# Patient Record
Sex: FEMALE | Race: WHITE | NOT HISPANIC OR LATINO | Employment: UNEMPLOYED | URBAN - METROPOLITAN AREA
[De-identification: names, ages, dates, MRNs, and addresses within clinical notes are randomized per-mention and may not be internally consistent; named-entity substitution may affect disease eponyms.]

---

## 2019-04-03 ENCOUNTER — APPOINTMENT (OUTPATIENT)
Dept: RADIOLOGY | Facility: MEDICAL CENTER | Age: 24
End: 2019-04-03
Attending: EMERGENCY MEDICINE
Payer: MEDICAID

## 2019-04-03 ENCOUNTER — HOSPITAL ENCOUNTER (EMERGENCY)
Facility: MEDICAL CENTER | Age: 24
End: 2019-04-03
Attending: EMERGENCY MEDICINE
Payer: MEDICAID

## 2019-04-03 VITALS
OXYGEN SATURATION: 96 % | TEMPERATURE: 97.7 F | HEART RATE: 65 BPM | DIASTOLIC BLOOD PRESSURE: 65 MMHG | SYSTOLIC BLOOD PRESSURE: 121 MMHG | RESPIRATION RATE: 14 BRPM | BODY MASS INDEX: 32.95 KG/M2 | WEIGHT: 163.14 LBS

## 2019-04-03 DIAGNOSIS — N93.8 DYSFUNCTIONAL UTERINE BLEEDING: ICD-10-CM

## 2019-04-03 LAB
APPEARANCE UR: CLEAR
B-HCG SERPL-ACNC: <0.6 MIU/ML (ref 0–5)
BACTERIA #/AREA URNS HPF: ABNORMAL /HPF
BASOPHILS # BLD AUTO: 0.2 % (ref 0–1.8)
BASOPHILS # BLD: 0.01 K/UL (ref 0–0.12)
BILIRUB UR QL STRIP.AUTO: NEGATIVE
COLOR UR: ABNORMAL
EOSINOPHIL # BLD AUTO: 0.05 K/UL (ref 0–0.51)
EOSINOPHIL NFR BLD: 0.8 % (ref 0–6.9)
EPI CELLS #/AREA URNS HPF: ABNORMAL /HPF
ERYTHROCYTE [DISTWIDTH] IN BLOOD BY AUTOMATED COUNT: 46.1 FL (ref 35.9–50)
GLUCOSE UR STRIP.AUTO-MCNC: NEGATIVE MG/DL
HCT VFR BLD AUTO: 38.6 % (ref 37–47)
HGB BLD-MCNC: 13 G/DL (ref 12–16)
IMM GRANULOCYTES # BLD AUTO: 0.01 K/UL (ref 0–0.11)
IMM GRANULOCYTES NFR BLD AUTO: 0.2 % (ref 0–0.9)
KETONES UR STRIP.AUTO-MCNC: NEGATIVE MG/DL
LEUKOCYTE ESTERASE UR QL STRIP.AUTO: ABNORMAL
LYMPHOCYTES # BLD AUTO: 1.59 K/UL (ref 1–4.8)
LYMPHOCYTES NFR BLD: 24.4 % (ref 22–41)
MCH RBC QN AUTO: 32.7 PG (ref 27–33)
MCHC RBC AUTO-ENTMCNC: 33.7 G/DL (ref 33.6–35)
MCV RBC AUTO: 97 FL (ref 81.4–97.8)
MICRO URNS: ABNORMAL
MONOCYTES # BLD AUTO: 0.38 K/UL (ref 0–0.85)
MONOCYTES NFR BLD AUTO: 5.8 % (ref 0–13.4)
NEUTROPHILS # BLD AUTO: 4.48 K/UL (ref 2–7.15)
NEUTROPHILS NFR BLD: 68.6 % (ref 44–72)
NITRITE UR QL STRIP.AUTO: NEGATIVE
NRBC # BLD AUTO: 0 K/UL
NRBC BLD-RTO: 0 /100 WBC
NUMBER OF RH DOSES IND 8505RD: NORMAL
PH UR STRIP.AUTO: 7 [PH]
PLATELET # BLD AUTO: 189 K/UL (ref 164–446)
PMV BLD AUTO: 10.1 FL (ref 9–12.9)
PROT UR QL STRIP: 100 MG/DL
RBC # BLD AUTO: 3.98 M/UL (ref 4.2–5.4)
RBC # URNS HPF: >150 /HPF
RBC UR QL AUTO: ABNORMAL
RH BLD: NORMAL
SP GR UR STRIP.AUTO: 1.01
UROBILINOGEN UR STRIP.AUTO-MCNC: 0.2 MG/DL
WBC # BLD AUTO: 6.5 K/UL (ref 4.8–10.8)
WBC #/AREA URNS HPF: ABNORMAL /HPF

## 2019-04-03 PROCEDURE — 76856 US EXAM PELVIC COMPLETE: CPT

## 2019-04-03 PROCEDURE — 81001 URINALYSIS AUTO W/SCOPE: CPT

## 2019-04-03 PROCEDURE — 84702 CHORIONIC GONADOTROPIN TEST: CPT

## 2019-04-03 PROCEDURE — 86901 BLOOD TYPING SEROLOGIC RH(D): CPT

## 2019-04-03 PROCEDURE — 99284 EMERGENCY DEPT VISIT MOD MDM: CPT

## 2019-04-03 PROCEDURE — 85025 COMPLETE CBC W/AUTO DIFF WBC: CPT

## 2019-04-03 RX ORDER — MEDROXYPROGESTERONE ACETATE 10 MG/1
10 TABLET ORAL DAILY
Qty: 10 TAB | Refills: 0 | Status: SHIPPED | OUTPATIENT
Start: 2019-04-03 | End: 2019-04-03

## 2019-04-03 RX ORDER — MEDROXYPROGESTERONE ACETATE 10 MG/1
10 TABLET ORAL DAILY
Qty: 10 TAB | Refills: 0 | Status: SHIPPED | OUTPATIENT
Start: 2019-04-03 | End: 2019-04-13

## 2019-04-03 NOTE — ED NOTES
Pt discharge ordered by provider. Pt discharge instructions reviewed with patient by this RN. Patient prescription information reviewed with patient by this RN. Pt verbalized understanding of discharge information and of prescription information. Pt discharged alert, oriented, and ambulatory by this RN.

## 2019-04-03 NOTE — ED NOTES
Iv placed labs collected and sent. Ambulated to bathroom, instructed on clean catch.   Pt moved to [facility].

## 2019-04-03 NOTE — ED TRIAGE NOTES
Chief Complaint   Patient presents with   • Vaginal Bleeding     Pt reports to be having vaginal bleeding for 3 days. pt states to be heavy (3 pads and hour per pt), has not had normal menses for 2 mos. pt describes blood to be mixed bright red blood with clots.    • Abdominal Cramping   Explained to pt triage process, made pt aware to tell this RN/staff of any changes/concerns, pt verbalized understanding of process and instructions given. Pt to ER lobby.

## 2019-04-03 NOTE — ED PROVIDER NOTES
ED Provider Note    Scribed for Sara Worthy M.D. by Keith Landrum. 4/3/2019  12:37 PM    Primary care provider: Maximus Young M.D.  Means of arrival: Walk-in  History obtained from: Patient  History limited by: None    CHIEF COMPLAINT  Chief Complaint   Patient presents with   • Vaginal Bleeding     Pt reports to be having vaginal bleeding for 3 days. pt states to be heavy (3 pads and hour per pt), has not had normal menses for 2 mos. pt describes blood to be mixed bright red blood with clots.    • Abdominal Cramping       HPI  Ger Rivera is a 23 y.o. female who presents to the Emergency Department complaining of vaginal bleeding that began two days ago. The bleeding was mild yesterday, but today she has consistently experienced vaginal bleeding every 30 minutes. Patient complains of lower abdominal cramping as well. She denies fever or vomiting. Patient is not currently sexually active. She is not taking birth control or supplemental hormones. Patient denies any prior abnormal pap smears. She does not see a gynecologist regularly. She is otherwise healthy and takes no regular medications.    REVIEW OF SYSTEMS  GI: Abdominal cramping. no vomiting  : Vaginal bleeding.  Endocrine: no fevers    See history of present illness. All other systems are negative. C.    PAST MEDICAL HISTORY   has a past medical history of Legal problem.    SURGICAL HISTORY  patient denies any surgical history    SOCIAL HISTORY  Social History   Substance Use Topics   • Smoking status: Current Every Day Smoker        • Alcohol use No      History   Drug Use No       CURRENT MEDICATIONS  No current facility-administered medications on file prior to encounter.      Current Outpatient Prescriptions on File Prior to Encounter   Medication Sig Dispense Refill   • ciprofloxacin (CIPRO) 500 MG TABS Take 500 mg by mouth 2 times a day.     • sertraline (ZOLOFT) 50 MG TABS Take 50 mg by mouth every day.         ALLERGIES  No  Known Allergies    PHYSICAL EXAM  VITAL SIGNS: /65   Pulse 67   Temp 36.5 °C (97.7 °F) (Temporal)   Resp 14   Wt 74 kg (163 lb 2.3 oz)   SpO2 96%   BMI 32.95 kg/m²     Constitutional: Well developed, Well nourished, Appears uncomfortable, Does not appear anemic. Non-toxic appearance.   HEENT: Normocephalic, Atraumatic,  external ears normal, pharynx pink,  Mucous  Membranes moist, No rhinorrhea or mucosal edema  Eyes: PERRL, EOMI, Conjunctiva normal, No discharge.   Neck: Normal range of motion, No tenderness, Supple, No stridor.   Lymphatic: No lymphadenopathy    Cardiovascular: Regular Rate and Rhythm, No murmurs,  rubs, or gallops.   Thorax & Lungs: Lungs clear to auscultation bilaterally, No respiratory distress, No wheezes, rhales or rhonchi, No chest wall tenderness.   Abdomen: Bowel sounds normal, Soft, Right lower pelvic tenderness, non distended,  No pulsatile masses., no rebound guarding or peritoneal signs.  Pelvic: Chaperoned by a female nurse. Small blood clot on the vaginal wall, os closed, uterus is non tender  Skin: Warm, Dry, No erythema, No rash,   Back:  No CVA tenderness,  No spinal tenderness, bony crepitance, step offs, or instability.   Neurologic: Alert & oriented x 3, Normal motor function, Normal sensory function, No focal deficits noted. Normal reflexes. Normal Cranial Nerves.  Extremities: Equal, intact distal pulses, No cyanosis, clubbing or edema,  No tenderness.   Musculoskeletal: Good range of motion in all major joints. No tenderness to palpation or major deformities noted.     DIAGNOSTIC STUDIES / PROCEDURES    LABS  Results for orders placed or performed during the hospital encounter of 04/03/19   CBC WITH DIFFERENTIAL   Result Value Ref Range    WBC 6.5 4.8 - 10.8 K/uL    RBC 3.98 (L) 4.20 - 5.40 M/uL    Hemoglobin 13.0 12.0 - 16.0 g/dL    Hematocrit 38.6 37.0 - 47.0 %    MCV 97.0 81.4 - 97.8 fL    MCH 32.7 27.0 - 33.0 pg    MCHC 33.7 33.6 - 35.0 g/dL    RDW 46.1 35.9  - 50.0 fL    Platelet Count 189 164 - 446 K/uL    MPV 10.1 9.0 - 12.9 fL    Neutrophils-Polys 68.60 44.00 - 72.00 %    Lymphocytes 24.40 22.00 - 41.00 %    Monocytes 5.80 0.00 - 13.40 %    Eosinophils 0.80 0.00 - 6.90 %    Basophils 0.20 0.00 - 1.80 %    Immature Granulocytes 0.20 0.00 - 0.90 %    Nucleated RBC 0.00 /100 WBC    Neutrophils (Absolute) 4.48 2.00 - 7.15 K/uL    Lymphs (Absolute) 1.59 1.00 - 4.80 K/uL    Monos (Absolute) 0.38 0.00 - 0.85 K/uL    Eos (Absolute) 0.05 0.00 - 0.51 K/uL    Baso (Absolute) 0.01 0.00 - 0.12 K/uL    Immature Granulocytes (abs) 0.01 0.00 - 0.11 K/uL    NRBC (Absolute) 0.00 K/uL   RH TYPE FOR RHOGAM FROM E.D.   Result Value Ref Range    Emergency Department Rh Typing POS     Number Of Rh Doses Indicated ZERO    URINALYSIS CULTURE, IF INDICATED   Result Value Ref Range    Color Red     Character Clear     Specific Gravity 1.006 <1.035    Ph 7.0 5.0 - 8.0    Glucose Negative Negative mg/dL    Ketones Negative Negative mg/dL    Protein 100 (A) Negative mg/dL    Bilirubin Negative Negative    Urobilinogen, Urine 0.2 Negative    Nitrite Negative Negative    Leukocyte Esterase Trace (A) Negative    Occult Blood Large (A) Negative    Micro Urine Req Microscopic    HCG QUANTITATIVE SERUM   Result Value Ref Range    Bhcg <0.6 0.0 - 5.0 mIU/mL   URINE MICROSCOPIC (W/UA)   Result Value Ref Range    WBC 2-5 /hpf    RBC >150 (A) /hpf    Bacteria Few (A) None /hpf    Epithelial Cells Rare /hpf     All labs reviewed by me.    RADIOLOGY  US-PELVIC COMPLETE (TRANSABDOMINAL/TRANSVAGINAL) (COMBO)   Final Result      Unremarkable transvaginal appearance of the pelvis.        The radiologist's interpretation of all radiological studies have been reviewed by me.    COURSE & MEDICAL DECISION MAKING  Nursing notes, VS, PMSFHx reviewed in chart.    12:37 PM Patient seen and examined at bedside. Ordered US pelvic complete, HCG qual serum, wet prep, chlamydia & GC by PCR, CBC with differential, Rh type for  rhogam from ED, U/A culture if indicated, HCG quantitative serum to evaluate her symptoms. The differential diagnoses include but are not limited to: anemia, miscarriage, dysfunctional uterine bleeding, uterine fibroid    12:54 PM Performed a pelvic exam chaperoned by a female nurse at this time, as above. Did not obtain cultures.    1:06 PM I ordered urine microscopic with U/A.    2:29 PM - Re-examined; The patient is resting in bed comfortably. I discussed her above findings and plans for discharge with a prescription for Provera. [facility] and instructed to return to the ED if her symptoms worsen. Patient understands and agrees.    Patient does not appear anemic. Patient prescribed a 10 day course of Provera to help with the bleeding. [facility] to [facility] to become established with a gynecologist. The patient will return for new or worsening symptoms and is stable at the time of discharge.    DISPOSITION:  Patient will be discharged home in stable condition.    FOLLOW UP:  [facility]  [address] [place] [id number]  [phone]    Call in 2 days  for recheck      OUTPATIENT MEDICATIONS:  Discharge Medication List as of 4/3/2019  2:50 PM      START taking these medications    Details   medroxyPROGESTERone (PROVERA) 10 MG Tab Take 1 Tab by mouth every day for 10 days., Disp-10 Tab, R-0, Normal             FINAL IMPRESSION  1. Dysfunctional uterine bleeding          Keith WALLS (Quinnibe), am scribing for, and in the presence of, Sara Worthy M.D..    Electronically signed by: Keith Landrum ([place]il[place]), 4/3/2019    Sara WALLS M.D. personally performed the services described in this documentation, as scribed by Keith Landrum in my presence, and it is both accurate and complete. C    The note accurately reflects work and decisions made by me.  Sara Worthy  4/3/2019  3:43 PM

## 2022-03-29 ENCOUNTER — HOSPITAL ENCOUNTER (OUTPATIENT)
Facility: MEDICAL CENTER | Age: 27
End: 2022-03-30
Attending: STUDENT IN AN ORGANIZED HEALTH CARE EDUCATION/TRAINING PROGRAM | Admitting: STUDENT IN AN ORGANIZED HEALTH CARE EDUCATION/TRAINING PROGRAM
Payer: MEDICAID

## 2022-03-29 ENCOUNTER — APPOINTMENT (OUTPATIENT)
Dept: RADIOLOGY | Facility: MEDICAL CENTER | Age: 27
End: 2022-03-29
Attending: STUDENT IN AN ORGANIZED HEALTH CARE EDUCATION/TRAINING PROGRAM
Payer: MEDICAID

## 2022-03-29 LAB
ALBUMIN SERPL BCP-MCNC: 4.2 G/DL (ref 3.2–4.9)
ALBUMIN/GLOB SERPL: 1.6 G/DL
ALP SERPL-CCNC: 130 U/L (ref 30–99)
ALT SERPL-CCNC: 95 U/L (ref 2–50)
ANION GAP SERPL CALC-SCNC: 12 MMOL/L (ref 7–16)
APAP SERPL-MCNC: <5 UG/ML (ref 10–30)
AST SERPL-CCNC: 152 U/L (ref 12–45)
BASOPHILS # BLD AUTO: 0.2 % (ref 0–1.8)
BASOPHILS # BLD: 0.01 K/UL (ref 0–0.12)
BILIRUB SERPL-MCNC: 0.2 MG/DL (ref 0.1–1.5)
BUN SERPL-MCNC: 17 MG/DL (ref 8–22)
CALCIUM SERPL-MCNC: 9.4 MG/DL (ref 8.5–10.5)
CHLORIDE SERPL-SCNC: 110 MMOL/L (ref 96–112)
CO2 SERPL-SCNC: 21 MMOL/L (ref 20–33)
CREAT SERPL-MCNC: 1.08 MG/DL (ref 0.5–1.4)
EKG IMPRESSION: NORMAL
EOSINOPHIL # BLD AUTO: 0.02 K/UL (ref 0–0.51)
EOSINOPHIL NFR BLD: 0.4 % (ref 0–6.9)
ERYTHROCYTE [DISTWIDTH] IN BLOOD BY AUTOMATED COUNT: 47.6 FL (ref 35.9–50)
ETHANOL BLD-MCNC: <10.1 MG/DL (ref 0–10)
GFR SERPLBLD CREATININE-BSD FMLA CKD-EPI: 72 ML/MIN/1.73 M 2
GLOBULIN SER CALC-MCNC: 2.7 G/DL (ref 1.9–3.5)
GLUCOSE SERPL-MCNC: 174 MG/DL (ref 65–99)
HCG SERPL QL: NEGATIVE
HCT VFR BLD AUTO: 43.1 % (ref 37–47)
HGB BLD-MCNC: 14 G/DL (ref 12–16)
IMM GRANULOCYTES # BLD AUTO: 0.01 K/UL (ref 0–0.11)
IMM GRANULOCYTES NFR BLD AUTO: 0.2 % (ref 0–0.9)
LYMPHOCYTES # BLD AUTO: 1.29 K/UL (ref 1–4.8)
LYMPHOCYTES NFR BLD: 28 % (ref 22–41)
MCH RBC QN AUTO: 31.5 PG (ref 27–33)
MCHC RBC AUTO-ENTMCNC: 32.5 G/DL (ref 33.6–35)
MCV RBC AUTO: 97.1 FL (ref 81.4–97.8)
MONOCYTES # BLD AUTO: 0.18 K/UL (ref 0–0.85)
MONOCYTES NFR BLD AUTO: 3.9 % (ref 0–13.4)
NEUTROPHILS # BLD AUTO: 3.1 K/UL (ref 2–7.15)
NEUTROPHILS NFR BLD: 67.3 % (ref 44–72)
NRBC # BLD AUTO: 0 K/UL
NRBC BLD-RTO: 0 /100 WBC
PLATELET # BLD AUTO: 166 K/UL (ref 164–446)
PMV BLD AUTO: 10.4 FL (ref 9–12.9)
POTASSIUM SERPL-SCNC: 3.4 MMOL/L (ref 3.6–5.5)
PROT SERPL-MCNC: 6.9 G/DL (ref 6–8.2)
RBC # BLD AUTO: 4.44 M/UL (ref 4.2–5.4)
SALICYLATES SERPL-MCNC: <1 MG/DL (ref 15–25)
SODIUM SERPL-SCNC: 143 MMOL/L (ref 135–145)
WBC # BLD AUTO: 4.6 K/UL (ref 4.8–10.8)

## 2022-03-29 PROCEDURE — 85025 COMPLETE CBC W/AUTO DIFF WBC: CPT

## 2022-03-29 PROCEDURE — 80053 COMPREHEN METABOLIC PANEL: CPT

## 2022-03-29 PROCEDURE — 84703 CHORIONIC GONADOTROPIN ASSAY: CPT

## 2022-03-29 PROCEDURE — 82077 ASSAY SPEC XCP UR&BREATH IA: CPT

## 2022-03-29 PROCEDURE — 80179 DRUG ASSAY SALICYLATE: CPT

## 2022-03-29 PROCEDURE — 93005 ELECTROCARDIOGRAM TRACING: CPT | Performed by: STUDENT IN AN ORGANIZED HEALTH CARE EDUCATION/TRAINING PROGRAM

## 2022-03-29 PROCEDURE — 71045 X-RAY EXAM CHEST 1 VIEW: CPT

## 2022-03-29 PROCEDURE — 99285 EMERGENCY DEPT VISIT HI MDM: CPT

## 2022-03-29 PROCEDURE — 80143 DRUG ASSAY ACETAMINOPHEN: CPT

## 2022-03-29 PROCEDURE — 36415 COLL VENOUS BLD VENIPUNCTURE: CPT

## 2022-03-30 VITALS
WEIGHT: 145 LBS | DIASTOLIC BLOOD PRESSURE: 41 MMHG | BODY MASS INDEX: 27.38 KG/M2 | SYSTOLIC BLOOD PRESSURE: 88 MMHG | RESPIRATION RATE: 17 BRPM | OXYGEN SATURATION: 100 % | TEMPERATURE: 98.9 F | HEART RATE: 95 BPM | HEIGHT: 61 IN

## 2022-03-30 PROBLEM — J69.0 ASPIRATION PNEUMONIA (HCC): Status: ACTIVE | Noted: 2022-03-30

## 2022-03-30 PROBLEM — J96.00 ACUTE RESPIRATORY FAILURE (HCC): Status: ACTIVE | Noted: 2022-03-30

## 2022-03-30 PROBLEM — T40.601A OPIATE OVERDOSE, ACCIDENTAL OR UNINTENTIONAL, INITIAL ENCOUNTER (HCC): Status: ACTIVE | Noted: 2022-03-30

## 2022-03-30 PROBLEM — E87.6 HYPOKALEMIA: Status: ACTIVE | Noted: 2022-03-30

## 2022-03-30 PROBLEM — T50.901A DRUG OVERDOSE, ACCIDENTAL OR UNINTENTIONAL, INITIAL ENCOUNTER: Status: ACTIVE | Noted: 2022-03-30

## 2022-03-30 LAB
ALBUMIN SERPL BCP-MCNC: 3.3 G/DL (ref 3.2–4.9)
ALBUMIN/GLOB SERPL: 1.4 G/DL
ALP SERPL-CCNC: 84 U/L (ref 30–99)
ALT SERPL-CCNC: 67 U/L (ref 2–50)
ANION GAP SERPL CALC-SCNC: 9 MMOL/L (ref 7–16)
AST SERPL-CCNC: 50 U/L (ref 12–45)
BILIRUB SERPL-MCNC: 0.5 MG/DL (ref 0.1–1.5)
BUN SERPL-MCNC: 18 MG/DL (ref 8–22)
CALCIUM SERPL-MCNC: 8.6 MG/DL (ref 8.5–10.5)
CHLORIDE SERPL-SCNC: 109 MMOL/L (ref 96–112)
CO2 SERPL-SCNC: 22 MMOL/L (ref 20–33)
CREAT SERPL-MCNC: 0.7 MG/DL (ref 0.5–1.4)
GFR SERPLBLD CREATININE-BSD FMLA CKD-EPI: 121 ML/MIN/1.73 M 2
GLOBULIN SER CALC-MCNC: 2.4 G/DL (ref 1.9–3.5)
GLUCOSE SERPL-MCNC: 98 MG/DL (ref 65–99)
POTASSIUM SERPL-SCNC: 4.8 MMOL/L (ref 3.6–5.5)
PROCALCITONIN SERPL-MCNC: 5.16 NG/ML
PROT SERPL-MCNC: 5.7 G/DL (ref 6–8.2)
SODIUM SERPL-SCNC: 140 MMOL/L (ref 135–145)

## 2022-03-30 PROCEDURE — G0378 HOSPITAL OBSERVATION PER HR: HCPCS

## 2022-03-30 PROCEDURE — 84145 PROCALCITONIN (PCT): CPT

## 2022-03-30 PROCEDURE — A9270 NON-COVERED ITEM OR SERVICE: HCPCS | Performed by: STUDENT IN AN ORGANIZED HEALTH CARE EDUCATION/TRAINING PROGRAM

## 2022-03-30 PROCEDURE — 96365 THER/PROPH/DIAG IV INF INIT: CPT

## 2022-03-30 PROCEDURE — 96367 TX/PROPH/DG ADDL SEQ IV INF: CPT

## 2022-03-30 PROCEDURE — 80053 COMPREHEN METABOLIC PANEL: CPT

## 2022-03-30 PROCEDURE — 36415 COLL VENOUS BLD VENIPUNCTURE: CPT

## 2022-03-30 PROCEDURE — 700105 HCHG RX REV CODE 258: Performed by: STUDENT IN AN ORGANIZED HEALTH CARE EDUCATION/TRAINING PROGRAM

## 2022-03-30 PROCEDURE — 99220 PR INITIAL OBSERVATION CARE,LEVL III: CPT | Performed by: STUDENT IN AN ORGANIZED HEALTH CARE EDUCATION/TRAINING PROGRAM

## 2022-03-30 PROCEDURE — 700102 HCHG RX REV CODE 250 W/ 637 OVERRIDE(OP): Performed by: STUDENT IN AN ORGANIZED HEALTH CARE EDUCATION/TRAINING PROGRAM

## 2022-03-30 PROCEDURE — 700111 HCHG RX REV CODE 636 W/ 250 OVERRIDE (IP): Performed by: STUDENT IN AN ORGANIZED HEALTH CARE EDUCATION/TRAINING PROGRAM

## 2022-03-30 RX ORDER — HEPARIN SODIUM 5000 [USP'U]/ML
5000 INJECTION, SOLUTION INTRAVENOUS; SUBCUTANEOUS EVERY 8 HOURS
Status: DISCONTINUED | OUTPATIENT
Start: 2022-03-30 | End: 2022-03-30

## 2022-03-30 RX ORDER — AMOXICILLIN 250 MG
2 CAPSULE ORAL 2 TIMES DAILY
Status: DISCONTINUED | OUTPATIENT
Start: 2022-03-30 | End: 2022-03-30 | Stop reason: HOSPADM

## 2022-03-30 RX ORDER — POTASSIUM CHLORIDE 20 MEQ/1
40 TABLET, EXTENDED RELEASE ORAL
Status: COMPLETED | OUTPATIENT
Start: 2022-03-30 | End: 2022-03-30

## 2022-03-30 RX ORDER — POLYETHYLENE GLYCOL 3350 17 G/17G
1 POWDER, FOR SOLUTION ORAL
Status: DISCONTINUED | OUTPATIENT
Start: 2022-03-30 | End: 2022-03-30 | Stop reason: HOSPADM

## 2022-03-30 RX ORDER — ACETAMINOPHEN 325 MG/1
650 TABLET ORAL EVERY 6 HOURS PRN
Status: DISCONTINUED | OUTPATIENT
Start: 2022-03-30 | End: 2022-03-30 | Stop reason: HOSPADM

## 2022-03-30 RX ORDER — BISACODYL 10 MG
10 SUPPOSITORY, RECTAL RECTAL
Status: DISCONTINUED | OUTPATIENT
Start: 2022-03-30 | End: 2022-03-30 | Stop reason: HOSPADM

## 2022-03-30 RX ADMIN — SODIUM CHLORIDE 3 G: 900 INJECTION INTRAVENOUS at 17:37

## 2022-03-30 RX ADMIN — POTASSIUM CHLORIDE 40 MEQ: 20 TABLET, EXTENDED RELEASE ORAL at 03:22

## 2022-03-30 RX ADMIN — POTASSIUM CHLORIDE 40 MEQ: 20 TABLET, EXTENDED RELEASE ORAL at 04:00

## 2022-03-30 RX ADMIN — SODIUM CHLORIDE 3 G: 900 INJECTION INTRAVENOUS at 12:05

## 2022-03-30 RX ADMIN — SODIUM CHLORIDE 3 G: 900 INJECTION INTRAVENOUS at 03:21

## 2022-03-30 ASSESSMENT — COGNITIVE AND FUNCTIONAL STATUS - GENERAL
SUGGESTED CMS G CODE MODIFIER DAILY ACTIVITY: CI
MOBILITY SCORE: 23
DRESSING REGULAR UPPER BODY CLOTHING: A LITTLE
DAILY ACTIVITIY SCORE: 23
SUGGESTED CMS G CODE MODIFIER MOBILITY: CI
CLIMB 3 TO 5 STEPS WITH RAILING: A LITTLE

## 2022-03-30 ASSESSMENT — PATIENT HEALTH QUESTIONNAIRE - PHQ9
SUM OF ALL RESPONSES TO PHQ9 QUESTIONS 1 AND 2: 0
2. FEELING DOWN, DEPRESSED, IRRITABLE, OR HOPELESS: NOT AT ALL
1. LITTLE INTEREST OR PLEASURE IN DOING THINGS: NOT AT ALL

## 2022-03-30 ASSESSMENT — ENCOUNTER SYMPTOMS
BLURRED VISION: 0
VOMITING: 0
NAUSEA: 1
NERVOUS/ANXIOUS: 0
GASTROINTESTINAL NEGATIVE: 1
SPEECH CHANGE: 0
EYES NEGATIVE: 1
LOSS OF CONSCIOUSNESS: 1
MUSCULOSKELETAL NEGATIVE: 1
SHORTNESS OF BREATH: 1
ABDOMINAL PAIN: 0
CARDIOVASCULAR NEGATIVE: 1
COUGH: 0
MYALGIAS: 0
DOUBLE VISION: 0
SHORTNESS OF BREATH: 0
DIZZINESS: 0
PSYCHIATRIC NEGATIVE: 1
FOCAL WEAKNESS: 0

## 2022-03-30 ASSESSMENT — LIFESTYLE VARIABLES
EVER FELT BAD OR GUILTY ABOUT YOUR DRINKING: NO
TOTAL SCORE: 0
HAVE YOU EVER FELT YOU SHOULD CUT DOWN ON YOUR DRINKING: NO
TOTAL SCORE: 0
CONSUMPTION TOTAL: NEGATIVE
HAVE PEOPLE ANNOYED YOU BY CRITICIZING YOUR DRINKING: NO
TOTAL SCORE: 0
EVER HAD A DRINK FIRST THING IN THE MORNING TO STEADY YOUR NERVES TO GET RID OF A HANGOVER: NO
HOW MANY TIMES IN THE PAST YEAR HAVE YOU HAD 5 OR MORE DRINKS IN A DAY: 0
SUBSTANCE_ABUSE: 1
DOES PATIENT WANT TO STOP DRINKING: NO
AVERAGE NUMBER OF DAYS PER WEEK YOU HAVE A DRINK CONTAINING ALCOHOL: 0
ON A TYPICAL DAY WHEN YOU DRINK ALCOHOL HOW MANY DRINKS DO YOU HAVE: 0

## 2022-03-30 ASSESSMENT — PAIN DESCRIPTION - PAIN TYPE: TYPE: ACUTE PAIN

## 2022-03-30 NOTE — PROGRESS NOTES
Patient expressed the desire to leave. Explained she's here for IV antibiotics due to aspiration pneumonia. Remained calm and cooperative during the shift.     Report given to Rosalva MIRANDA on Allyson 5.

## 2022-03-30 NOTE — ED NOTES
Assumed care of patient. Patient ambulated back to room from bathroom. Gait steady and even. No s/s of distress noted. Patient back in bed and monitors and oxygen mask back in place. Patient provided with ice water per request. Denies any further needs at this time.

## 2022-03-30 NOTE — ASSESSMENT & PLAN NOTE
Infiltrate seen on right lower lobe, likely aspiration from drug overdose   Unasyn started, no other systemic signs of infection, will check procal and if negative may discontinue abx as likely chemical pneumonitis rather then infectious   Monitor

## 2022-03-30 NOTE — PROGRESS NOTES
[facility] Medicine Daily Progress Note    Date of Service  3/30/2022    Chief Complaint  Ger Rivera is a 26 y.o. female admitted 3/29/2022 with unintentional drug overdose     Hospital Course  No notes on file   27 yo female with past medical history of polysubstance abuse presents to the ER via EMS after she unintentionally overdosed after Snorting 3 norco tabs and smoking marijuana. Patient was given nasal narcan by EMS with improvement in her mental status.   On arrival she was tachycardic and hypoxic. Xray showed RLL infiltrate, she was started on IV unasyn and admitted for further evaluation.     Interval Problem Update  Patient seen and examined, still very lethargic and falling asleep quickly but responding appropriately, significant other in bed with her. denies any symptoms of SOB, cough or chest pain   - vitals stable, requiring 4-6L mask   - will check pro calcitonin, continue unasyn in meantime for aspiration pneumonia   - wean O2 as able  - monitor for signs of withdrawal     Anticipate discharge once patient is ambulatory with improved oxygen saturations     I have personally seen and examined the patient at bedside. I discussed the plan of care with patient and bedside RN.    Consultants/Specialty  NA    Code Status  Full Code    Disposition  Patient is not medically cleared for discharge.   Anticipate discharge to to home with close outpatient follow-up.  I have placed the appropriate orders for post-discharge needs.    Review of Systems  Review of Systems   Constitutional: Positive for malaise/fatigue.   HENT: Negative for congestion.    Eyes: Negative for blurred vision and double vision.   Respiratory: Negative for cough and shortness of breath.    Cardiovascular: Negative for chest pain and leg swelling.   Gastrointestinal: Positive for nausea. Negative for abdominal pain and vomiting.   Musculoskeletal: Negative for myalgias.   Neurological: Positive for loss of consciousness. Negative  for dizziness, speech change and focal weakness.   Psychiatric/Behavioral: Positive for substance abuse. Negative for suicidal ideas. The patient is not nervous/anxious.         Physical Exam  Temp:  [36.1 °C (97 °F)-36.4 °C (97.5 °F)] 36.4 °C (97.5 °F)  Pulse:  [] 96  Resp:  [18-24] 18  BP: ()/(51-89) 97/52  SpO2:  [85 %-100 %] 93 %    Physical Exam  Constitutional:       General: She is not in acute distress.     Appearance: She is normal weight. She is ill-appearing. She is not toxic-appearing.      Comments: Somnolent  Young female   HENT:      Head: Normocephalic and atraumatic.      Mouth/Throat:      Mouth: Mucous membranes are dry.   Eyes:      Extraocular Movements: Extraocular movements intact.      Conjunctiva/sclera: Conjunctivae normal.   Cardiovascular:      Rate and Rhythm: Normal rate and regular rhythm.   Pulmonary:      Effort: Pulmonary effort is normal.      Comments: Wearing oxymask  Abdominal:      General: Bowel sounds are normal.      Palpations: Abdomen is soft.   Musculoskeletal:         General: Normal range of motion.      Cervical back: Neck supple.   Skin:     General: Skin is warm and dry.   Neurological:      General: No focal deficit present.      Cranial Nerves: No cranial nerve deficit.   Psychiatric:      Comments: Somnolent but arousable, flat         Fluids  No intake or output data in the 24 hours ending 03/30/22 1111    Laboratory  Recent Labs     03/29/22  2215   WBC 4.6*   RBC 4.44   HEMOGLOBIN 14.0   HEMATOCRIT 43.1   MCV 97.1   MCH 31.5   MCHC 32.5*   RDW 47.6   PLATELETCT 166   MPV 10.4     Recent Labs     03/29/22  2215   SODIUM 143   POTASSIUM 3.4*   CHLORIDE 110   CO2 21   GLUCOSE 174*   BUN 17   CREATININE 1.08   CALCIUM 9.4                   Imaging  DX-CHEST-PORTABLE (1 VIEW)   Final Result         1.  Patchy pulmonary edema or infiltrates, greater on the right.           Assessment/Plan  * Drug overdose, accidental or unintentional, initial encounter-  (present on admission)  Assessment & Plan  unintentional drug overdose following inhalation of Norcos, denies SI   Received narcan by EMS with improvement in mentation, still somnolent and requiring O2 but respiratory rate is steady   UDS pending   Monitor for withdrawal   Keep oxygen as needed, keep O2 over 90%  Pt counseled on cessation of drug use    Acute respiratory failure (HCC)- (present on admission)  Assessment & Plan  Hypoxic on admission due to unintentional narcotic overdose   Narcan in field with improvement   O2 per protocol, wean as able   Encourage IS and mobility     Aspiration pneumonia (HCC)  Assessment & Plan  Infiltrate seen on right lower lobe, likely aspiration from drug overdose   Unasyn started, no other systemic signs of infection, will check procal and if negative may discontinue abx as likely chemical pneumonitis rather then infectious   Monitor       Hypokalemia  Assessment & Plan  Replace       VTE prophylaxis: SCDs/TEDs Xarelto     I have performed a physical exam and reviewed and updated ROS and Plan today (3/30/2022). In review of yesterday's note (3/29/2022), there are no changes except as documented above.

## 2022-03-30 NOTE — ASSESSMENT & PLAN NOTE
unintentional drug overdose following inhalation of Norcos, denies SI   Received narcan by EMS with improvement in mentation, still somnolent and requiring O2 but respiratory rate is steady   UDS pending   Monitor for withdrawal   Keep oxygen as needed, keep O2 over 90%  Pt counseled on cessation of drug use

## 2022-03-30 NOTE — ED TRIAGE NOTES
BIBA for a OD. Pt admits to snorting three norcos and smoking THC. Per EMS fire gave 2mg nasal narcan and preformed CPR. Pt wakes to verbal stimuli.

## 2022-03-30 NOTE — ED NOTES
Patient resting on gurney with eyes closed. Respirations even and unlabored. No s/s of distress noted. VSS.

## 2022-03-30 NOTE — ED NOTES
Patient resting on gurney. Respirations even and unlabored. No s/s of distress noted. Oxygen mask still in place. Patient denies any further needs at this time.

## 2022-03-30 NOTE — H&P
Hospital Medicine History & Physical Note    Date of Service  3/30/2022    Primary Care Physician  No primary care provider on file.    Consultants  None    Code Status  Full Code    Chief Complaint  Chief Complaint   Patient presents with   • Drug Overdose     BIBA for a OD. Pt admits to snorting three norcos and smoking THC. Per EMS fire gave 2mg nasal narcan and preformed CPR. Pt wakes to verbal stimuli.        History of Presenting Illness  Ger Rivera is a 26 y.o. female who presented 3/29/2022 with altered mental status.  Patient snorted 3 Norco's and was smoking marijuana when she suddenly felt lethargic.  She apparently passed out and woke up with EMS around her.  EMS administered nasal Narcan to which patient's mental status improved.  This was her first time  snorting [place].    In ED, patient found to be tachycardic and hypoxic.  Chest x-ray shows right lower lobe infiltrate.  Medicine consulted for management as patient hypoxia and lethargy.    I discussed the plan of care with patient.    Review of Systems  Review of Systems   Constitutional: Positive for malaise/fatigue.   HENT: Negative.    Eyes: Negative.    Respiratory: Positive for shortness of breath.    Cardiovascular: Negative.    Gastrointestinal: Negative.    Genitourinary: Negative.    Musculoskeletal: Negative.    Skin: Negative.    Neurological: Positive for loss of consciousness.   Endo/Heme/Allergies: Negative.    Psychiatric/Behavioral: Negative.        Past Medical History  No pertinent medical history    Surgical History  No pertinent surgical history    Family History   Family history reviewed with patient. There is no family history that is pertinent to the chief complaint.     Social History       Allergies  Not on File    Medications  None       Physical Exam  Temp:  [36.1 °C (97 °F)] 36.1 °C (97 °F)  Pulse:  [] 86  Resp:  [20-24] 20  BP: (100-115)/(55-69) 100/55  SpO2:  [85 %-100 %] 90 %  Blood Pressure: 100/55    Temperature: 36.1 °C (97 °F)   Pulse: 86   Respiration: 20   Pulse Oximetry: 90 %       Physical Exam  Constitutional:       Appearance: Normal appearance. She is normal weight.      Comments: Drowsy upon interview  However does cooperate   HENT:      Head: Normocephalic.      Nose: Nose normal.      Mouth/Throat:      Mouth: Mucous membranes are moist.   Eyes:      Pupils: Pupils are equal, round, and reactive to light.   Cardiovascular:      Rate and Rhythm: Normal rate and regular rhythm.   Pulmonary:      Effort: Pulmonary effort is normal.      Breath sounds: Normal breath sounds.   Abdominal:      General: Abdomen is flat. Bowel sounds are normal.      Palpations: Abdomen is soft.   Musculoskeletal:         General: Normal range of motion.      Cervical back: Normal range of motion.   Skin:     General: Skin is warm.   Neurological:      General: No focal deficit present.      Mental Status: She is oriented to person, place, and time.   Psychiatric:         Thought Content: Thought content normal.         Laboratory:  Recent Labs     03/29/22  2215   WBC 4.6*   RBC 4.44   HEMOGLOBIN 14.0   HEMATOCRIT 43.1   MCV 97.1   MCH 31.5   MCHC 32.5*   RDW 47.6   PLATELETCT 166   MPV 10.4     Recent Labs     03/29/22  2215   SODIUM 143   POTASSIUM 3.4*   CHLORIDE 110   CO2 21   GLUCOSE 174*   BUN 17   CREATININE 1.08   CALCIUM 9.4     Recent Labs     03/29/22  2215   ALTSGPT 95*   ASTSGOT 152*   ALKPHOSPHAT 130*   TBILIRUBIN 0.2   GLUCOSE 174*         No results for input(s): NTPROBNP in the last 72 hours.      No results for input(s): TROPONINT in the last 72 hours.    Imaging:  DX-CHEST-PORTABLE (1 VIEW)   Final Result         1.  Patchy pulmonary edema or infiltrates, greater on the right.          X-Ray:  I have personally reviewed the images and compared with prior images.    Assessment/Plan:  I anticipate this patient is appropriate for observation status at this time.    * Drug overdose, accidental or  unintentional, initial encounter- (present on admission)  Assessment & Plan  Admit patient to telemetry  Start Unasyn for aspiration pneumonia  Keep oxygen as needed, keep O2 over 90%    Aspiration pneumonia (HCC)  Assessment & Plan  Infiltrate seen on right lower lobe   Unasyn      Hypokalemia  Assessment & Plan  Replace        VTE prophylaxis: heparin ppx

## 2022-03-30 NOTE — ED NOTES
Pt placed on mask as she breathes through her mouth during sleep. Pt's significant other remains at bedside.

## 2022-03-30 NOTE — ED NOTES
Med rec completed per patient  Allergies reviewed  No PO Antibiotics in the last 30 days     Patient states she does not take any medications, RX or OTC

## 2022-03-30 NOTE — ASSESSMENT & PLAN NOTE
Hypoxic on admission due to unintentional narcotic overdose   Narcan in field with improvement   O2 per protocol, wean as able   Encourage IS and mobility

## 2022-03-30 NOTE — ED PROVIDER NOTES
"CHIEF COMPLAINT  Chief Complaint   Patient presents with   • Drug Overdose     BIBA for a OD. Pt admits to taking three norcos. Per EMS fire gave 2mg nasal narcan and preformed CPR. Pt wakes to verbal stimuli.        HPI  Ger Rivera is a 26 y.o. female who presents for evaluation after an accidental overdose.  Patient has a history of polysubstance abuse.  Was found at home apneic and cyanotic, received 2 mg of Narcan by fire prior to [facility] arrival.  Upon [facility] arrival the patient was awake and breathing spontaneously.  She did have bystander CPR performed prior to fire department arrival.  Though per EMS report she was initially found to have a pulse by fire.  Patient denies any suicidal attempt in the drug ingestion.  He does not know the milligram doses of the Norco's that she took.  Denies any current complaints.      REVIEW OF SYSTEMS  See HPI for further details. All other systems are negative.     PAST MEDICAL HISTORY       SOCIAL HISTORY  Social History     Tobacco Use   • Smoking status: Not on file   • Smokeless tobacco: Not on file   Substance and Sexual Activity   • Alcohol use: Not on file   • Drug use: Not on file   • Sexual activity: Not on file       SURGICAL HISTORY  patient denies any surgical history    CURRENT MEDICATIONS  Home Medications    **Home medications have not yet been reviewed for this encounter**         ALLERGIES  Not on File    FAMILY HISTORY  No pertinent family history    PHYSICAL EXAM   Ht 1.549 m (5' 1\")   Wt 65.8 kg (145 lb)   BMI 27.40 kg/m²  @CATHY[[id number]::@   Pulse ox interpretation: I interpret this pulse ox as normal.  VITALS - vital signs documented prior to this note have been reviewed and noted,  GENERAL - awake, alert, oriented, GCS 15, no apparent distress, non-toxic  appearing  HEENT -pupils are 2 mm and reactive bilaterally normocephalic, atraumatic, pupils equal, sclera anicteric, mucus  membranes moist  NECK - supple, no meningismus, full active range of " motion, trachea midline  CARDIOVASCULAR - regular rate/rhythm, no murmurs/gallops/rubs  PULMONARY - no respiratory distress, speaking in full sentences, clear to  auscultation bilaterally, no wheezing/ronchi/rales, no accessory muscle use  GASTROINTESTINAL - soft, non-tender, non-distended, no rebound, guarding,  or peritonitis  GENITOURINARY - Deferred  NEUROLOGIC - Awake alert, normal mental status, speech fluid, cognition  normal, moves all extremities  MUSCULOSKELETAL - no obvious asymmetry or deformities present  EXTREMITIES - warm, well-perfused, no cyanosis or significant edema  DERMATOLOGIC - warm, dry, no rashes, no jaundice  PSYCHIATRIC - normal affect, normal insight, normal concentration            EKG  EKG obtained at 105 shows a normal sinus tachycardia at a rate of 105 with a normal [place] QRS QTc interval there is an isolated inverted T wave in lead III otherwise no ST elevation depression T wave inversion to suggest ischemia interpretation is sinus tachycardia no stemi    LABS  Labs Reviewed   CBC WITH DIFFERENTIAL   COMP METABOLIC PANEL   DIAGNOSTIC ALCOHOL   ACETAMINOPHEN   SALICYLATE   HCG QUAL SERUM       No results found for this or any previous visit.      Pertinent Labs & Imaging studies reviewed. (See chart for details)    RADIOLOGY  DX-CHEST-PORTABLE (1 VIEW)    (Results Pending)             ED COURSE     Medications - No data to display    MEDICAL DECISION MAKING    Patient presented for evaluation of an overdose.  Was found apneic and cyanotic by EMS which did resolve after Narcan.  Upon arrival in the emergency department patient denies any suicidal attempt to the OD, lab work-up is negative for coingestions and is otherwise unremarkable.  Patient remains persistently hypoxic when taken off oxygen and chest x-ray reveals a right lower lobe infiltrate.  Per EMS she was found surrounded by vomit, this may represent an aspiration pneumonitis.  I believe this infiltrate is likely pneumonitis and  unlikely to represent a pneumonia as such IV antibiotics were withheld. Bedside ultrasound is negative for B-lines lower concern for flash pulmonary edema.  Given the new onset hypoxic respiratory failure will admit to medicine for further care and evaluation.    Critical care    Critical Care Procedure Note    Total critical care time: Approximately 36 minutes    Upon my assess due to a high probability of clinically significant, life threatening deterioration, secondary to hypoxic respiratory failure the patient required my direct attention and intervention. This critical care time included obtaining a history; examining the patient; pulse oximetry; ordering and review of studies; arranging urgent treatment with development of a management plan; evaluation of patient's response to treatment; frequent reassessment; and, discussions with other providers.    was exclusive of separately billable procedures and treating other patients and teaching time.    The patient will not drink alcohol nor drive with prescribed medications. The patient will return for worsening symptoms and is stable at the time of discharge. The patient verbalizes understanding and will comply.    FINAL IMPRESSION  1.  Overdose  2.  Hypoxic respiratory failure  3.  Aspiration pneumonitis         Electronically signed by: Jamil Chu D.O., 3/29/2022 10:08 PM      Dictation Disclaimer  Please note this report has been produced using speech recognition software and  may contain errors related to that system, including errors seen in grammar,  punctuation and spelling, as well as words and phrases that may be inappropriate.  If there are any questions or concerns, please feel free to contact the dictating  physician for clarification.

## 2022-03-31 NOTE — PROGRESS NOTES
4 Eyes Skin Assessment Completed by Marilyn RN and RUBEN Monroy.    Head Bruising on ;eft eyebrow area  Ears WDL  Nose WDL  Mouth WDL  Neck WDL  Breast/Chest WDL  Shoulder Blades WDL  Spine WDL  (R) Arm/Elbow/Hand WDL  (L) Arm/Elbow/Hand WDL  Abdomen WDL  Groin WDL  Scrotum/Coccyx/Buttocks WDL  (R) Leg WDL  (L) Leg WDL  (R) Heel/Foot/Toe WDL  (L) Heel/Foot/Toe WDL          Devices In Places Blood Pressure Cuff, Pulse Ox and Oxy Mask      Interventions In Place N/A    Possible Skin Injury No    Pictures Uploaded Into Epic N/A  Wound Consult Placed N/A  RN Wound Prevention Protocol Ordered No

## 2022-03-31 NOTE — CARE PLAN
The patient is Stable - Low risk of patient condition declining or worsening    Shift Goals  Clinical Goals: ABX  Patient Goals: rest, comfort    Progress made toward(s) clinical / shift goals:  Patient progressing towards goals during shift.      Problem: Knowledge Deficit - Standard  Goal: Patient and family/care givers will demonstrate understanding of plan of care, disease process/condition, diagnostic tests and medications  Outcome: Progressing   Patient updated on plan of care.

## 2022-03-31 NOTE — PROGRESS NOTES
Paged by nursing regarding patient having left against medical advice. I was unable to speak with patient regarding risks of leaving the hospital as she has already left.

## 2022-03-31 NOTE — PROGRESS NOTES
Assumed care of pt  at 1645. Report received by ER RN. Pt is A&O x 4. Pain level is 4/10 declines medication at this time. Bed in lowest locked position, call light within reach, hourly rounding in place. Labs reviewed, orders reviewed, communication board updated. .